# Patient Record
Sex: FEMALE | Race: WHITE | NOT HISPANIC OR LATINO | Employment: FULL TIME | URBAN - METROPOLITAN AREA
[De-identification: names, ages, dates, MRNs, and addresses within clinical notes are randomized per-mention and may not be internally consistent; named-entity substitution may affect disease eponyms.]

---

## 2017-08-22 ENCOUNTER — PATIENT OUTREACH (OUTPATIENT)
Dept: ADMINISTRATIVE | Facility: HOSPITAL | Age: 47
End: 2017-08-22

## 2017-08-22 NOTE — LETTER
August 22, 2017    Lorrie Dailey  [address] [place] [address]             [facility]  [address]  [facility] [address]  Phone: [phone] Dear Mrs. Dailey:     [facility] currently has Alcon Marroquin MD  listed as your Primary Care Physician. Our records indicate that you have never established care with Alcon Marroquin MD or have not been seen in 2 years or more.      To update your information, please contact your insurance company and request   your primary care physician be updated to reflect the [facility].      If you are a current [facility] patient and the listed physician is correct, please   call [phone] to update your information and re-establish care with Alcon Marroquin MD.    Please disregard this letter if you have already contacted our office.    Thank you,   Vera MONK LPN  Care Coordination Department  [facility], [facility], & [facility]  Phone Number: [phone]